# Patient Record
Sex: MALE | Race: WHITE | Employment: FULL TIME | URBAN - NONMETROPOLITAN AREA
[De-identification: names, ages, dates, MRNs, and addresses within clinical notes are randomized per-mention and may not be internally consistent; named-entity substitution may affect disease eponyms.]

---

## 2018-03-28 ENCOUNTER — HOSPITAL ENCOUNTER (EMERGENCY)
Age: 38
Discharge: HOME | End: 2018-03-28
Attending: NURSE PRACTITIONER
Payer: COMMERCIAL

## 2018-03-28 VITALS
HEART RATE: 68 BPM | OXYGEN SATURATION: 95 % | SYSTOLIC BLOOD PRESSURE: 166 MMHG | DIASTOLIC BLOOD PRESSURE: 92 MMHG | TEMPERATURE: 99.2 F | RESPIRATION RATE: 16 BRPM

## 2018-03-28 DIAGNOSIS — H10.9 CONJUNCTIVITIS OF LEFT EYE, UNSPECIFIED CONJUNCTIVITIS TYPE: Primary | ICD-10-CM

## 2018-03-28 PROCEDURE — 99202 OFFICE O/P NEW SF 15 MIN: CPT | Performed by: NURSE PRACTITIONER

## 2018-03-28 PROCEDURE — 99213 OFFICE O/P EST LOW 20 MIN: CPT

## 2018-03-28 PROCEDURE — 6370000000 HC RX 637 (ALT 250 FOR IP): Performed by: NURSE PRACTITIONER

## 2018-03-28 RX ORDER — BUPRENORPHINE HYDROCHLORIDE, NALOXONE HYDROCHLORIDE 8; 2 MG/1; MG/1
8 FILM, SOLUBLE BUCCAL; SUBLINGUAL 2 TIMES DAILY
Refills: 0 | COMMUNITY
Start: 2018-03-27

## 2018-03-28 RX ORDER — PROPARACAINE HYDROCHLORIDE 5 MG/ML
1 SOLUTION/ DROPS OPHTHALMIC ONCE
Status: COMPLETED | OUTPATIENT
Start: 2018-03-28 | End: 2018-03-28

## 2018-03-28 RX ORDER — SOFT LENS RINSE,STORE SOLUTION
1 SOLUTION, NON-ORAL MISCELLANEOUS ONCE
Status: COMPLETED | OUTPATIENT
Start: 2018-03-28 | End: 2018-03-28

## 2018-03-28 RX ORDER — GENTAMICIN SULFATE 3 MG/ML
1 SOLUTION/ DROPS OPHTHALMIC 4 TIMES DAILY
Qty: 1 BOTTLE | Refills: 0 | Status: SHIPPED | OUTPATIENT
Start: 2018-03-28 | End: 2018-04-07

## 2018-03-28 RX ADMIN — Medication 1 DROP: at 16:33

## 2018-03-28 RX ADMIN — PROPARACAINE HYDROCHLORIDE 1 DROP: 5 SOLUTION/ DROPS OPHTHALMIC at 16:33

## 2018-03-28 ASSESSMENT — PAIN DESCRIPTION - DESCRIPTORS: DESCRIPTORS: DULL;NUMBNESS

## 2018-03-28 ASSESSMENT — PAIN SCALES - GENERAL: PAINLEVEL_OUTOF10: 5

## 2018-03-28 ASSESSMENT — PAIN DESCRIPTION - FREQUENCY: FREQUENCY: INTERMITTENT

## 2018-03-28 ASSESSMENT — ENCOUNTER SYMPTOMS
PHOTOPHOBIA: 1
EYE DISCHARGE: 1
EYE ITCHING: 0
EYE REDNESS: 1
EYE PAIN: 1

## 2018-03-28 ASSESSMENT — VISUAL ACUITY
OD: 20/20
OU: 20/20
OS: 20/20

## 2018-03-28 ASSESSMENT — PAIN DESCRIPTION - PAIN TYPE: TYPE: ACUTE PAIN

## 2018-03-28 ASSESSMENT — PAIN DESCRIPTION - ORIENTATION: ORIENTATION: LEFT

## 2018-03-28 NOTE — ED PROVIDER NOTES
ED TRIAGE VITALS  BP: (!) 166/92, Temp: 99.2 °F (37.3 °C), Pulse: 68, Resp: 16, SpO2: 95 %  Physical Exam   Constitutional: He is oriented to person, place, and time. He appears well-developed and well-nourished. No distress. HENT:   Head: Normocephalic and atraumatic. Eyes:       Neurological: He is alert and oriented to person, place, and time. Skin: Skin is warm and dry. Psychiatric: He has a normal mood and affect. Nursing note and vitals reviewed. DIAGNOSTIC RESULTS   Labs:No results found for this visit on 03/28/18. IMAGING:    URGENT CARE COURSE:     Vitals:    03/28/18 1617   BP: (!) 166/92   Pulse: 68   Resp: 16   Temp: 99.2 °F (37.3 °C)   TempSrc: Temporal   SpO2: 95%       Medications   proparacaine (ALCAINE) 0.5 % ophthalmic solution 1 drop (1 drop Left Eye Given 3/28/18 1633)   eye wash ophthalmic solution 1 drop (1 drop Left Eye Given 3/28/18 1633)     PROCEDURES:  None  FINAL IMPRESSION      1. Conjunctivitis of left eye, unspecified conjunctivitis type        DISPOSITION/PLAN   DISPOSITION Decision To Discharge 03/28/2018 04:34:47 PM   Patient presented with conjunctivitis of the left eye most likely secondary to an outside irritant or exposure to welding. There were no corneal scratches or abrasions noted and no foreign bodies. Patient will be given a prescription for gentamicin ophthalmic drops. Recommend he follows up with an optometrist or ophthalmologist if symptoms aren't improving. Visual acuity was 20/20 both eyes.     PATIENT REFERRED TO:    see an eye doctor if not improving    Dr Neil Gomes and Pooja Joyner in [facility]  or one of your choosing        DISCHARGE MEDICATIONS:  Discharge Medication List as of 3/28/2018  4:40 PM      START taking these medications    Details   gentamicin (GARAMYCIN) 0.3 % ophthalmic solution Place 1 drop into the left eye 4 times daily for 10 days, Disp-1 Bottle, [id number]           Discharge Medication List as of 3/28/2018  4:40 PM          Jordana Hercules Ashley, CNP           Link Mention, CNP  03/28/18 3633

## 2018-03-28 NOTE — ED NOTES
Patient discharge instructions given to pt and pt verbalized understanding, 1 px given, no other needs at this time, and pt left in stable condition.      Ariadna Hernandez RN  03/28/18 9108